# Patient Record
Sex: MALE | Race: WHITE | URBAN - METROPOLITAN AREA
[De-identification: names, ages, dates, MRNs, and addresses within clinical notes are randomized per-mention and may not be internally consistent; named-entity substitution may affect disease eponyms.]

---

## 2017-01-23 PROBLEM — S09.90XA HEAD INJURY, INITIAL ENCOUNTER: Status: ACTIVE | Noted: 2017-01-23

## 2017-01-30 PROBLEM — Z72.89 ALCOHOL USE: Status: ACTIVE | Noted: 2017-01-30

## 2017-01-30 PROBLEM — Z78.9 ALCOHOL USE: Status: ACTIVE | Noted: 2017-01-30

## 2017-02-07 PROCEDURE — 36415 COLL VENOUS BLD VENIPUNCTURE: CPT | Performed by: FAMILY MEDICINE

## 2017-02-07 PROCEDURE — 86334 IMMUNOFIX E-PHORESIS SERUM: CPT | Performed by: FAMILY MEDICINE

## 2017-02-07 PROCEDURE — 83883 ASSAY NEPHELOMETRY NOT SPEC: CPT | Performed by: FAMILY MEDICINE

## 2017-02-07 PROCEDURE — 84165 PROTEIN E-PHORESIS SERUM: CPT | Performed by: FAMILY MEDICINE

## 2017-05-12 PROBLEM — Z12.11 ENCOUNTER FOR SCREENING COLONOSCOPY: Status: ACTIVE | Noted: 2017-05-12

## 2017-10-17 PROBLEM — F13.10 BENZODIAZEPINE ABUSE (HCC): Status: ACTIVE | Noted: 2017-10-17

## 2017-10-17 PROBLEM — F10.10 ALCOHOL ABUSE: Status: ACTIVE | Noted: 2017-10-17

## 2017-10-17 PROBLEM — F10.931 ALCOHOL WITHDRAWAL SYNDROME, WITH DELIRIUM (HCC): Status: ACTIVE | Noted: 2017-10-17

## 2017-10-17 PROBLEM — F14.10 COCAINE ABUSE (HCC): Status: ACTIVE | Noted: 2017-10-17

## 2017-10-17 PROBLEM — F10.231 ALCOHOL WITHDRAWAL SYNDROME, WITH DELIRIUM (HCC): Status: ACTIVE | Noted: 2017-10-17

## 2017-11-02 PROCEDURE — 80074 ACUTE HEPATITIS PANEL: CPT | Performed by: FAMILY MEDICINE

## 2017-12-19 PROBLEM — M75.42 IMPINGEMENT SYNDROME OF LEFT SHOULDER: Status: ACTIVE | Noted: 2017-12-19

## 2017-12-19 PROBLEM — M75.22 BICEPS TENDINITIS, LEFT: Status: ACTIVE | Noted: 2017-12-19

## 2017-12-19 PROBLEM — M75.82 TENDINITIS OF LEFT ROTATOR CUFF: Status: ACTIVE | Noted: 2017-12-19

## 2017-12-28 PROBLEM — M25.512 LEFT SHOULDER PAIN, UNSPECIFIED CHRONICITY: Status: ACTIVE | Noted: 2017-12-28

## 2018-01-08 PROBLEM — M79.671 PAIN IN RIGHT FOOT: Status: ACTIVE | Noted: 2018-01-08

## 2018-01-08 PROBLEM — S93.324D LISFRANC DISLOCATION, RIGHT, SUBSEQUENT ENCOUNTER: Status: ACTIVE | Noted: 2018-01-08

## 2018-03-26 PROBLEM — F10.231 ALCOHOL WITHDRAWAL SYNDROME, WITH DELIRIUM (HCC): Status: RESOLVED | Noted: 2017-10-17 | Resolved: 2018-03-26

## 2018-03-26 PROBLEM — F10.931 ALCOHOL WITHDRAWAL SYNDROME, WITH DELIRIUM (HCC): Status: RESOLVED | Noted: 2017-10-17 | Resolved: 2018-03-26

## 2018-12-03 PROBLEM — M79.671 PAIN IN RIGHT FOOT: Status: RESOLVED | Noted: 2018-01-08 | Resolved: 2018-12-03

## 2018-12-03 PROBLEM — M25.512 LEFT SHOULDER PAIN, UNSPECIFIED CHRONICITY: Status: RESOLVED | Noted: 2017-12-28 | Resolved: 2018-12-03

## 2018-12-03 PROBLEM — M75.42 IMPINGEMENT SYNDROME OF LEFT SHOULDER: Status: RESOLVED | Noted: 2017-12-19 | Resolved: 2018-12-03

## 2018-12-03 PROBLEM — F13.10 BENZODIAZEPINE ABUSE (HCC): Status: RESOLVED | Noted: 2017-10-17 | Resolved: 2018-12-03

## 2018-12-03 PROBLEM — M75.82 TENDINITIS OF LEFT ROTATOR CUFF: Status: RESOLVED | Noted: 2017-12-19 | Resolved: 2018-12-03

## 2018-12-03 PROBLEM — F14.11 H/O COCAINE ABUSE (HCC): Status: ACTIVE | Noted: 2018-12-03

## 2018-12-03 PROBLEM — S09.90XA HEAD INJURY, INITIAL ENCOUNTER: Status: RESOLVED | Noted: 2017-01-23 | Resolved: 2018-12-03

## 2018-12-03 PROBLEM — S93.324D LISFRANC DISLOCATION, RIGHT, SUBSEQUENT ENCOUNTER: Status: RESOLVED | Noted: 2018-01-08 | Resolved: 2018-12-03

## 2018-12-03 PROBLEM — F14.10 COCAINE ABUSE (HCC): Status: RESOLVED | Noted: 2017-10-17 | Resolved: 2018-12-03

## 2018-12-03 PROBLEM — M75.22 BICEPS TENDINITIS, LEFT: Status: RESOLVED | Noted: 2017-12-19 | Resolved: 2018-12-03

## 2018-12-06 PROCEDURE — 81003 URINALYSIS AUTO W/O SCOPE: CPT | Performed by: FAMILY MEDICINE

## 2018-12-13 PROBLEM — E66.9 OBESITY (BMI 30.0-34.9): Status: ACTIVE | Noted: 2018-12-13

## 2018-12-13 PROBLEM — R73.09 ELEVATED HEMOGLOBIN A1C: Status: ACTIVE | Noted: 2018-12-13

## 2018-12-13 PROBLEM — E55.9 VITAMIN D DEFICIENCY: Status: ACTIVE | Noted: 2018-12-13

## 2018-12-13 PROBLEM — D58.2 ELEVATED HEMOGLOBIN (HCC): Status: ACTIVE | Noted: 2018-12-13

## 2018-12-13 PROBLEM — E78.01 FAMILIAL HYPERCHOLESTEROLEMIA: Status: ACTIVE | Noted: 2018-12-13

## 2019-12-09 PROBLEM — J01.00 ACUTE NON-RECURRENT MAXILLARY SINUSITIS: Status: ACTIVE | Noted: 2019-12-09

## 2019-12-19 PROBLEM — J01.00 ACUTE NON-RECURRENT MAXILLARY SINUSITIS: Status: RESOLVED | Noted: 2019-12-09 | Resolved: 2019-12-19

## 2020-03-17 PROBLEM — N52.9 ERECTILE DYSFUNCTION, UNSPECIFIED ERECTILE DYSFUNCTION TYPE: Status: ACTIVE | Noted: 2020-03-17

## 2021-04-20 PROBLEM — R22.1 LOCALIZED SWELLING, MASS AND LUMP, NECK: Status: ACTIVE | Noted: 2021-04-20

## 2021-04-20 PROBLEM — F43.21 GRIEF REACTION: Status: ACTIVE | Noted: 2021-04-20

## 2021-05-23 PROBLEM — R73.01 IMPAIRED FASTING GLUCOSE: Status: ACTIVE | Noted: 2021-05-23

## 2021-05-23 PROBLEM — R79.89 LOW VITAMIN D LEVEL: Status: ACTIVE | Noted: 2021-05-23

## 2021-09-22 PROBLEM — Z91.19 NON-COMPLIANCE: Status: ACTIVE | Noted: 2021-09-22

## 2021-09-22 PROBLEM — J18.9 PNEUMONIA OF RIGHT LOWER LOBE DUE TO INFECTIOUS ORGANISM: Status: ACTIVE | Noted: 2021-09-22

## 2021-09-22 PROBLEM — E87.1 HYPONATREMIA: Status: ACTIVE | Noted: 2021-09-22

## 2021-09-22 PROBLEM — R50.9 FEVER AND CHILLS: Status: ACTIVE | Noted: 2021-09-22

## 2021-09-22 PROBLEM — N28.9 RENAL INSUFFICIENCY: Status: ACTIVE | Noted: 2021-09-22

## 2021-09-22 PROBLEM — Z91.199 NON-COMPLIANCE: Status: ACTIVE | Noted: 2021-09-22

## 2021-09-22 PROBLEM — E87.6 HYPOKALEMIA: Status: ACTIVE | Noted: 2021-09-22

## 2021-10-21 PROBLEM — N28.9 RENAL INSUFFICIENCY: Status: RESOLVED | Noted: 2021-09-22 | Resolved: 2021-10-21

## 2021-10-21 PROBLEM — E87.6 HYPOKALEMIA: Status: RESOLVED | Noted: 2021-09-22 | Resolved: 2021-10-21

## 2021-10-21 PROBLEM — R50.9 FEVER AND CHILLS: Status: RESOLVED | Noted: 2021-09-22 | Resolved: 2021-10-21

## 2024-03-18 ENCOUNTER — APPOINTMENT (OUTPATIENT)
Dept: GENERAL RADIOLOGY | Age: 69
End: 2024-03-18
Attending: EMERGENCY MEDICINE
Payer: COMMERCIAL

## 2024-03-18 ENCOUNTER — APPOINTMENT (OUTPATIENT)
Dept: CT IMAGING | Age: 69
End: 2024-03-18
Attending: EMERGENCY MEDICINE
Payer: COMMERCIAL

## 2024-03-18 ENCOUNTER — HOSPITAL ENCOUNTER (EMERGENCY)
Age: 69
Discharge: HOME | End: 2024-03-18
Attending: EMERGENCY MEDICINE
Payer: COMMERCIAL

## 2024-03-18 VITALS
RESPIRATION RATE: 12 BRPM | DIASTOLIC BLOOD PRESSURE: 84 MMHG | SYSTOLIC BLOOD PRESSURE: 155 MMHG | WEIGHT: 220 LBS | HEART RATE: 78 BPM | HEIGHT: 72 IN | TEMPERATURE: 98 F | OXYGEN SATURATION: 95 % | BODY MASS INDEX: 29.8 KG/M2

## 2024-03-18 DIAGNOSIS — S40.019A CONTUSION OF SHOULDER, UNSPECIFIED LATERALITY, INITIAL ENCOUNTER: ICD-10-CM

## 2024-03-18 DIAGNOSIS — E87.1 HYPONATREMIA: Primary | ICD-10-CM

## 2024-03-18 DIAGNOSIS — E87.6 HYPOKALEMIA: ICD-10-CM

## 2024-03-18 DIAGNOSIS — M54.50 ACUTE RIGHT-SIDED LOW BACK PAIN, UNSPECIFIED WHETHER SCIATICA PRESENT: ICD-10-CM
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
## 2024-03-18 LAB
ALBUMIN SERPL-MCNC: 3.8 G/DL (ref 3.4–5)
ALBUMIN/GLOB SERPL: 1 {RATIO} (ref 1–2)
ALP LIVER SERPL-CCNC: 108 U/L
ALT SERPL-CCNC: 65 U/L
ANION GAP SERPL CALC-SCNC: 10 MMOL/L (ref 0–18)
AST SERPL-CCNC: 42 U/L (ref 15–37)
ATRIAL RATE: 86 BPM
BASOPHILS # BLD AUTO: 0.04 X10(3) UL (ref 0–0.2)
BASOPHILS NFR BLD AUTO: 0.6 %
BILIRUB SERPL-MCNC: 0.8 MG/DL (ref 0.1–2)
BILIRUB UR QL STRIP.AUTO: NEGATIVE
BUN BLD-MCNC: 6 MG/DL (ref 9–23)
CALCIUM BLD-MCNC: 8.5 MG/DL (ref 8.5–10.1)
CHLORIDE SERPL-SCNC: 93 MMOL/L (ref 98–112)
CLARITY UR REFRACT.AUTO: CLEAR
CO2 SERPL-SCNC: 25 MMOL/L (ref 21–32)
COLOR UR AUTO: YELLOW
CREAT BLD-MCNC: 0.68 MG/DL
EGFRCR SERPLBLD CKD-EPI 2021: 103 ML/MIN/1.73M2 (ref 60–?)
EOSINOPHIL # BLD AUTO: 0.02 X10(3) UL (ref 0–0.7)
EOSINOPHIL NFR BLD AUTO: 0.3 %
ERYTHROCYTE [DISTWIDTH] IN BLOOD BY AUTOMATED COUNT: 11.7 %
GLOBULIN PLAS-MCNC: 3.8 G/DL (ref 2.8–4.4)
GLUCOSE BLD-MCNC: 106 MG/DL (ref 70–99)
GLUCOSE UR STRIP.AUTO-MCNC: NEGATIVE MG/DL
HCT VFR BLD AUTO: 43.3 %
HGB BLD-MCNC: 16 G/DL
IMM GRANULOCYTES # BLD AUTO: 0.02 X10(3) UL (ref 0–1)
IMM GRANULOCYTES NFR BLD: 0.3 %
INR BLD: 1.04 (ref 0.8–1.2)
KETONES UR STRIP.AUTO-MCNC: 15 MG/DL
LEUKOCYTE ESTERASE UR QL STRIP.AUTO: NEGATIVE
LYMPHOCYTES # BLD AUTO: 1 X10(3) UL (ref 1–4)
LYMPHOCYTES NFR BLD AUTO: 15.2 %
MCH RBC QN AUTO: 30 PG (ref 26–34)
MCHC RBC AUTO-ENTMCNC: 37 G/DL (ref 31–37)
MCV RBC AUTO: 81.2 FL
MONOCYTES # BLD AUTO: 0.51 X10(3) UL (ref 0.1–1)
MONOCYTES NFR BLD AUTO: 7.8 %
NEUTROPHILS # BLD AUTO: 4.98 X10 (3) UL (ref 1.5–7.7)
NEUTROPHILS # BLD AUTO: 4.98 X10(3) UL (ref 1.5–7.7)
NEUTROPHILS NFR BLD AUTO: 75.8 %
NITRITE UR QL STRIP.AUTO: NEGATIVE
OSMOLALITY SERPL CALC.SUM OF ELEC: 264 MOSM/KG (ref 275–295)
P AXIS: 42 DEGREES
P-R INTERVAL: 194 MS
PH UR STRIP.AUTO: 7 [PH] (ref 5–8)
PLATELET # BLD AUTO: 174 10(3)UL (ref 150–450)
POTASSIUM SERPL-SCNC: 3.3 MMOL/L (ref 3.5–5.1)
PROT SERPL-MCNC: 7.6 G/DL (ref 6.4–8.2)
PROT UR STRIP.AUTO-MCNC: NEGATIVE MG/DL
PROTHROMBIN TIME: 13.6 SECONDS (ref 11.6–14.8)
Q-T INTERVAL: 390 MS
QRS DURATION: 106 MS
QTC CALCULATION (BEZET): 466 MS
R AXIS: 14 DEGREES
RBC # BLD AUTO: 5.33 X10(6)UL
RBC UR QL AUTO: NEGATIVE
SODIUM SERPL-SCNC: 128 MMOL/L (ref 136–145)
SP GR UR STRIP.AUTO: 1.01 (ref 1–1.03)
T AXIS: 43 DEGREES
UROBILINOGEN UR STRIP.AUTO-MCNC: 0.2 MG/DL
VENTRICULAR RATE: 86 BPM
WBC # BLD AUTO: 6.6 X10(3) UL (ref 4–11)

## 2024-03-18 PROCEDURE — 96360 HYDRATION IV INFUSION INIT: CPT

## 2024-03-18 PROCEDURE — 99285 EMERGENCY DEPT VISIT HI MDM: CPT

## 2024-03-18 PROCEDURE — 93010 ELECTROCARDIOGRAM REPORT: CPT

## 2024-03-18 PROCEDURE — 80053 COMPREHEN METABOLIC PANEL: CPT | Performed by: EMERGENCY MEDICINE

## 2024-03-18 PROCEDURE — 85025 COMPLETE CBC W/AUTO DIFF WBC: CPT | Performed by: EMERGENCY MEDICINE

## 2024-03-18 PROCEDURE — 93005 ELECTROCARDIOGRAM TRACING: CPT

## 2024-03-18 PROCEDURE — 74174 CTA ABD&PLVS W/CONTRAST: CPT | Performed by: EMERGENCY MEDICINE

## 2024-03-18 PROCEDURE — 73030 X-RAY EXAM OF SHOULDER: CPT | Performed by: EMERGENCY MEDICINE

## 2024-03-18 PROCEDURE — 96361 HYDRATE IV INFUSION ADD-ON: CPT

## 2024-03-18 PROCEDURE — 81003 URINALYSIS AUTO W/O SCOPE: CPT | Performed by: EMERGENCY MEDICINE

## 2024-03-18 PROCEDURE — 85610 PROTHROMBIN TIME: CPT | Performed by: EMERGENCY MEDICINE

## 2024-03-18 PROCEDURE — 70450 CT HEAD/BRAIN W/O DYE: CPT | Performed by: EMERGENCY MEDICINE

## 2024-03-18 RX ORDER — DIAZEPAM 5 MG/1
5 TABLET ORAL ONCE
Status: COMPLETED | OUTPATIENT
Start: 2024-03-18 | End: 2024-03-18

## 2024-03-18 RX ORDER — SODIUM CHLORIDE 9 MG/ML
INJECTION, SOLUTION INTRAVENOUS ONCE
Status: COMPLETED | OUTPATIENT
Start: 2024-03-18 | End: 2024-03-18

## 2024-03-18 RX ORDER — POTASSIUM CHLORIDE 20 MEQ/1
20 TABLET, EXTENDED RELEASE ORAL ONCE
Status: COMPLETED | OUTPATIENT
Start: 2024-03-18 | End: 2024-03-18

## 2024-03-18 RX ORDER — DIAZEPAM 5 MG/1
5 TABLET ORAL EVERY 12 HOURS PRN
Qty: 10 TABLET | Refills: 0 | Status: SHIPPED | OUTPATIENT
Start: 2024-03-18 | End: 2024-03-25

## 2024-03-18 RX ORDER — PREDNISONE 20 MG/1
40 TABLET ORAL DAILY
Qty: 10 TABLET | Refills: 0 | Status: SHIPPED | OUTPATIENT
Start: 2024-03-18 | End: 2024-03-23

## 2024-03-18 RX ORDER — HYDROCODONE BITARTRATE AND ACETAMINOPHEN 5; 325 MG/1; MG/1
2 TABLET ORAL ONCE
Status: COMPLETED | OUTPATIENT
Start: 2024-03-18 | End: 2024-03-18

## 2024-03-18 NOTE — ED INITIAL ASSESSMENT (HPI)
Pt c/o right lower back pain that radiates down his leg that woke him up at 0200 this morning. He said he fell in his yard around 4pm yesterday but didn't have any pain at that time. He admits to drinking some alcohol and cocaine use prior to the fall. He also c/o chronic right shoulder pain that he says has been bothering him lately. No chest pain. He took 800mg Ibuprofen @3am.

## 2024-03-18 NOTE — ED PROVIDER NOTES
Patient Seen in: [facility] In [place]      History     Chief Complaint   Patient presents with    Back Pain    Arm or Hand Injury     Stated Complaint:     Subjective:   HPI    This is a 68-year-old gentleman history of hypertension here for evaluation of right low back pain.  States yesterday he was drinking, also using cocaine.  States was getting out of a vehicle, fell down onto his right side striking his shoulder.  Did not strike his head there was no LOC.  States felt okay at the time woke up this morning with fairly severe low right back pain, worse with movement but does not quite resolved at rest.  Denies chest pain shortness of breath and exertional symptoms, any new focal weakness or numbness any change in bowel or bladder habits any focal abdominal pain, any other concerning symptoms.  Has had right shoulder pain in the past, from years of playing baseball, believes it was aggravated.  No other complaints at this time.            Objective:   Past Medical History:   Diagnosis Date    Benzodiazepine abuse ([facility]) 10/17/2017    Cocaine abuse ([facility]) 10/17/2017    Essential hypertension     Fever and chills 9/22/2021              Past Surgical History:   Procedure Laterality Date    OTHER SURGICAL HISTORY Right     Knee Surgery     OTHER SURGICAL HISTORY Right     middle finger surgery (blood posion)    TONSILLECTOMY                  Social History     Socioeconomic History    Marital status: Unknown   Tobacco Use    Smoking status: Former     Passive exposure: Never    Smokeless tobacco: Never   Vaping Use    Vaping Use: Never used   Substance and Sexual Activity    Alcohol use: Yes     Alcohol/week: 0.0 standard drinks of alcohol     Comment: Beer     Drug use: Yes     Types: Cocaine              Review of Systems    Positive for stated complaint:   Other systems are as noted in HPI.  Constitutional and vital signs reviewed.      All other systems reviewed and negative except as noted  above.    Physical Exam     ED Triage Vitals   BP 03/18/24 0547 (!) 161/86   Pulse 03/18/24 0547 90   Resp 03/18/24 0547 18   Temp 03/18/24 0547 98.2 °F (36.8 °C)   Temp src 03/18/24 0547 Temporal   SpO2 03/18/24 0547 98 %   O2 Device 03/18/24 0704 None (Room air)       Current:/84   Pulse 78   Temp 98.2 °F (36.8 °C) (Temporal)   Resp 12   Ht 182.9 cm (6')   Wt 99.8 kg   SpO2 95%   BMI 29.84 kg/m²         Physical Exam    Vitals signs and nursing note reviewed.   General: Well-appearing gentleman sitting up in bed in no acute distress  Head: Normocephalic and atraumatic.   HEENT:  Mucous membranes are moist.   Cardiovascular:  Normal rate and regular rhythm.  No Edema  Pulmonary:  Pulmonary effort is normal.  Normal breath sounds. No wheezing, rhonchi or rales.   Abdominal: Soft nontender nondistended, normal bowel sounds, no guarding no rebound tenderness  Back: No midline tenderness. Strength is 5 over 5 with flexion and extension at the hip knee and ankle bilaterally. Sensation intact to light touch throughout bilateral lower extremities. Negative straight leg raise bilaterally.  DP pulses 2+ bilaterally. Skin: Warm and dry  Neurological: Awake alert, speech is normal    ED Course     Labs Reviewed   COMP METABOLIC PANEL (14) - Abnormal; Notable for the following components:       Result Value    Glucose 106 (*)     Sodium 128 (*)     Potassium 3.3 (*)     Chloride 93 (*)     BUN 6 (*)     Creatinine 0.68 (*)     Calculated Osmolality 264 (*)     AST 42 (*)     ALT 65 (*)     All other components within normal limits   URINALYSIS, ROUTINE - Abnormal; Notable for the following components:    Ketones Urine 15 (*)     All other components within normal limits   PROTHROMBIN TIME (PT) - Normal   CBC WITH DIFFERENTIAL WITH PLATELET    Narrative:     The following orders were created for panel order CBC With Differential With Platelet.  Procedure                               Abnormality         Status                      ---------                               -----------         ------                     CBC W/ DIFFERENTIAL[[id number]]                              Final result                 Please view results for these tests on the individual orders.   CBC W/ DIFFERENTIAL     EKG    Rate, intervals and axes as noted on EKG Report.  Rate: 86  Rhythm: Sinus Rhythm  Reading: No acute ischemic changes                          MDM                                         Medical Decision Making  68-year-old gentleman here for evaluation of right low back pain, right shoulder pain after fall.  History of cocaine use, was using yesterday, blood pressure uncontrolled.  Differential includes musculoskeletal low back pain, vertebral compression fracture, abdominal aortic aneurysm, abdominal aortic dissection, kidney stone.  Does not believe he struck his head but states he was drinking earlier.  Will check basic labs obtain a CTA of the abdomen pelvis to rule out intra-abdominal including aortic pathology, also CT of the head to rule out intracranial injury, right shoulder x-ray as patient reports exacerbation of right shoulder discomfort, no gross abnormality on exam.  EKG taken from triage unremarkable patient without any exertional symptoms chest pain or shortness of breath.  CT of the abdomen pelvis shows no gross abnormality does demonstrate some degenerative changes in the lumbar spine patient reports improvement in discomfort after pain medication muscle relaxant here.  Believe likely low back strain.  DC home with short supply of muscle relaxants to use as needed also recommend NSAIDs, counseled against any continued cocaine use.  Hyponatremia and hypokalemia noted patient did receive 1 L normal saline, reports excessive alcohol intake yesterday, believe hyponatremia likely secondary to dehydration, will follow-up with PMD for recheck of sodium and potassium, return precautions discussed he is in agreement with  plan    Problems Addressed:  Acute right-sided low back pain, unspecified whether sciatica present: acute illness or injury  Contusion of shoulder, unspecified laterality, initial encounter: acute illness or injury  Fall, initial encounter: acute illness or injury  Hypokalemia: acute illness or injury  Hyponatremia: acute illness or injury    Amount and/or Complexity of Data Reviewed  Independent Historian: priya  Labs: ordered. Decision-making details documented in ED Course.  Radiology: ordered and independent interpretation performed. Decision-making details documented in ED Course.     Details: I independently viewed and interpreted the following imaging: CT abdomen pelvis without evidence of bowel obstruction  ECG/medicine tests: ordered and independent interpretation performed. Decision-making details documented in ED Course.    Risk  Prescription drug management.        Disposition and Plan     Clinical Impression:  1. Hyponatremia    2. Fall, initial encounter    3. Acute right-sided low back pain, unspecified whether sciatica present    4. Hypokalemia    5. Contusion of shoulder, unspecified laterality, initial encounter         Disposition:  Discharge  3/18/2024  9:26 am    Follow-up:  Aide Subramanian [place]  [id number] [address] [address]  [phone]    Follow up  Follow-up with your PMD for reevaluation in 24 to 48 hours.  Return to ER if symptoms worsen or change or if any other new concerns.    Posterior potassium and sodium are low, this should be rechecked within the next 3 to 4 days.          Medications Prescribed:  Discharge Medication List as of 3/18/2024  9:28 AM        START taking these medications    Details   predniSONE 20 MG Oral Tab Take 2 tablets (40 mg total) by mouth daily for 5 days., Normal, Disp-10 tablet, R-0      diazePAM 5 MG Oral Tab Take 1 tablet (5 mg total) by mouth every 12 (twelve) hours as needed (As needed for muscle spasm, do not take this medication prior to  drinking alcohol or driving as this medication can impair your senses.)., Normal, Disp-10 tablet, R-0

## (undated) NOTE — LETTER
Date & Time: 3/18/2024, 9:27 AM  Patient: Austen Joseph  Encounter Provider(s):    Fredrick Santos MD       To Whom It May Concern:    Austen Joseph was seen and treated in our department on 3/18/2024. He can return to work with these limitations: No heavy lifting over the next 2-weeks pending improvement in symptoms .    If you have any questions or concerns, please do not hesitate to call.        _____________________________  Physician/AP[facility] Signature